# Patient Record
Sex: MALE | Race: WHITE | NOT HISPANIC OR LATINO | Employment: FULL TIME | URBAN - METROPOLITAN AREA
[De-identification: names, ages, dates, MRNs, and addresses within clinical notes are randomized per-mention and may not be internally consistent; named-entity substitution may affect disease eponyms.]

---

## 2018-12-19 ENCOUNTER — OFFICE VISIT (OUTPATIENT)
Dept: FAMILY MEDICINE CLINIC | Facility: CLINIC | Age: 33
End: 2018-12-19

## 2018-12-19 VITALS
TEMPERATURE: 98.4 F | BODY MASS INDEX: 23.86 KG/M2 | HEIGHT: 68 IN | SYSTOLIC BLOOD PRESSURE: 144 MMHG | RESPIRATION RATE: 18 BRPM | HEART RATE: 80 BPM | DIASTOLIC BLOOD PRESSURE: 82 MMHG | WEIGHT: 157.4 LBS

## 2018-12-19 DIAGNOSIS — B35.1 ONYCHOMYCOSIS OF TOENAIL: ICD-10-CM

## 2018-12-19 DIAGNOSIS — B35.3 TINEA PEDIS OF BOTH FEET: ICD-10-CM

## 2018-12-19 DIAGNOSIS — L84 CALLUS OF FOOT: Primary | ICD-10-CM

## 2018-12-19 PROBLEM — F17.200 CURRENT SMOKER: Status: ACTIVE | Noted: 2018-12-19

## 2018-12-19 PROBLEM — F41.8 DEPRESSION WITH ANXIETY: Status: ACTIVE | Noted: 2018-12-19

## 2018-12-19 PROBLEM — F10.10 ALCOHOL ABUSE: Status: ACTIVE | Noted: 2018-12-19

## 2018-12-19 PROCEDURE — 99213 OFFICE O/P EST LOW 20 MIN: CPT | Performed by: FAMILY MEDICINE

## 2018-12-19 RX ORDER — CLOTRIMAZOLE AND BETAMETHASONE DIPROPIONATE 10; .64 MG/G; MG/G
CREAM TOPICAL 2 TIMES DAILY
Qty: 15 G | Refills: 2 | Status: SHIPPED | OUTPATIENT
Start: 2018-12-19 | End: 2019-05-07

## 2018-12-19 RX ORDER — CITALOPRAM 40 MG/1
1 TABLET ORAL DAILY
COMMUNITY
Start: 2011-10-19 | End: 2018-12-19

## 2018-12-19 RX ORDER — LORAZEPAM 0.5 MG/1
TABLET ORAL
COMMUNITY
Start: 2011-05-12 | End: 2018-12-19

## 2018-12-19 RX ORDER — FLUTICASONE PROPIONATE 50 MCG
2 SPRAY, SUSPENSION (ML) NASAL DAILY
COMMUNITY
Start: 2015-01-02 | End: 2018-12-19

## 2018-12-19 NOTE — PROGRESS NOTES
FAMILY PRACTICE OFFICE VISIT  [address]  [address], [address]      NAME: Yocasta Silveira  AGE: 26 y o  SEX: male  : 1985   MRN: [id number]    DATE: 2018  TIME: 1:44 PM    Assessment and Plan     Problem List Items Addressed This Visit     None      Visit Diagnoses     Callus of foot    -  Primary    Relevant Medications    clotrimazole-betamethasone (LOTRISONE) 1-0 05 % cream    Tinea pedis of both feet        Relevant Medications    clotrimazole-betamethasone (LOTRISONE) 1-0 05 % cream    Onychomycosis of toenail              Patient Instructions   He will use Lotrisone cream twice daily for 2 weeks, if not improving after 4 weeks I would like him to see Podiatry, consider nail clipping, has degree onychomycosis great nail  If would require oral antifungal he needs liver function testing, past history elevated LFT  He will call if this persists or worsens  Chief Complaint     Chief Complaint   Patient presents with   [facility] [place] [facility]    Foot Problem       History of Present Illness   Yocasta Silveira is a 26y o -year-old male who has noted callus formation toes past 1 5 yrs -   Concerned may have picked up 'hookworm' while incarcerated  Snips areas w clipper    Used Tinactin w/out benefit      Review of Systems   Review of Systems   Constitutional: Negative for fatigue and fever  Respiratory: Negative for cough, chest tightness and shortness of breath  1/2 PPD smoker   Cardiovascular: Negative for chest pain, palpitations and leg swelling  Gastrointestinal: Negative for abdominal distention, abdominal pain, blood in stool, nausea and vomiting  Genitourinary: Negative for dysuria and hematuria  Musculoskeletal: Positive for arthralgias (right shoulder aches)  Neurological: Negative for dizziness, syncope and light-headedness     Psychiatric/Behavioral:        Hx depression w anxiety -   No current tx -  Was incarcerated until 2 yrs ago -    Past issues w etoh-  Sober x 2 yrs    No drug use       Active Problem List     Patient Active Problem List   Diagnosis    Current smoker    Depression with anxiety    Elevated liver enzymes    Alcohol abuse       Past Medical History:  Past Medical History:   Diagnosis Date    Anxiety     Depression     Headache        Past Surgical History:  Past Surgical History:   Procedure Laterality Date    WISDOM TOOTH EXTRACTION         Family History:  Family History   Problem Relation Age of Onset    Diabetes Mother     Depression Mother     Mental illness Mother     Mental illness Father     Depression Father     Hypertension Maternal Grandmother     Hypertension Maternal Grandfather     Stroke Maternal Grandfather        Social History:  Social History     Social History    Marital status: Single     Spouse name: N/A    Number of children: N/A    Years of education: N/A     Occupational History    Not on file  Social History Main Topics    Smoking status: Current Every Day Smoker     Packs/day: 0 50     Types: Cigarettes    Smokeless tobacco: Never Used    Alcohol use No    Drug use: Yes     Types: Marijuana    Sexual activity: Not on file     Other Topics Concern    Not on file     Social History Narrative    No narrative on file       Objective     Vitals:    12/19/18 1323   BP: 144/82   Pulse: 80   Resp: 18   Temp: 98 4 °F (36 9 °C)   Weight: 71 4 kg (157 lb 6 4 oz)   Height: 5' 7 5" (1 715 m)     Body mass index is 24 29 kg/m²  BP Readings from Last 3 Encounters:   12/19/18 144/82   11/09/15 130/88   08/31/15 150/98       Wt Readings from Last 3 Encounters:   12/19/18 71 4 kg (157 lb 6 4 oz)   11/09/15 80 3 kg (177 lb)   08/31/15 77 6 kg (171 lb 2 1 oz)       Physical Exam   Constitutional: He appears well-developed and well-nourished  No distress     Skin:   Multiple areas callus w thickening toes, area nail yellowing right grt nail bindu    Dry cracked       ALLERGIES:  Allergies   Allergen Reactions    Bupropion Irritability    Codeine        Current Medications     Current Outpatient Prescriptions   Medication Sig Dispense Refill    clotrimazole-betamethasone (LOTRISONE) 1-0 05 % cream Apply topically 2 (two) times a day for 14 days 15 g 2     No current facility-administered medications for this visit  Most recent labs available from [address] [place] [address]   ( others may be available in Care Everywhere / Media sections)  Lab Results   Component Value Date    WBC 7 18 07/08/2015    HGB 16 6 07/08/2015    HCT 48 6 07/08/2015     07/08/2015    LDLDIRECT 198 07/08/2015     (H) 07/08/2015    AST 93 (H) 07/08/2015     (L) 07/08/2015    K 4 0 07/08/2015     07/08/2015    CREATININE 1 07 07/08/2015    BUN 16 07/08/2015    CO2 26 07/08/2015     No results found for: [facility]  Lab Results   Component Value Date    VTW0QHFMUJVF 2 505 07/08/2015         No orders of the defined types were placed in this encounter          Misty Kanner, DO

## 2018-12-19 NOTE — LETTER
December 19, 2018     Patient: Naveen Muse   YOB: 1985   Date of Visit: 12/19/2018       To Whom it May Concern:    Juanito Madera is under my professional care  He was seen in my office on 12/19/2018  If you have any questions or concerns, please don't hesitate to call           Sincerely,          Aruna Munoz, DO

## 2018-12-19 NOTE — PATIENT INSTRUCTIONS
He will use Lotrisone cream twice daily for 2 weeks, if not improving after 4 weeks I would like him to see Podiatry, consider nail clipping, has degree onychomycosis great nail  If would require oral antifungal he needs liver function testing, past history elevated LFT  He will call if this persists or worsens

## 2018-12-20 ENCOUNTER — TELEPHONE (OUTPATIENT)
Dept: FAMILY MEDICINE CLINIC | Facility: CLINIC | Age: 33
End: 2018-12-20

## 2018-12-20 NOTE — TELEPHONE ENCOUNTER
Pt feels that he has a problem with his feet   Thinks that he sees little black things that are moving where he trimmed the calloses on his toes  occassionally notes itching, pinching and little stabs of sharp pain  Wants to see a podiatrist   Harmony Sanchez him number for [facility]

## 2019-05-07 ENCOUNTER — TELEPHONE (OUTPATIENT)
Dept: OTHER | Facility: OTHER | Age: 34
End: 2019-05-07

## 2019-05-07 ENCOUNTER — OFFICE VISIT (OUTPATIENT)
Dept: FAMILY MEDICINE CLINIC | Facility: CLINIC | Age: 34
End: 2019-05-07
Payer: COMMERCIAL

## 2019-05-07 VITALS
TEMPERATURE: 97.8 F | OXYGEN SATURATION: 97 % | HEART RATE: 88 BPM | SYSTOLIC BLOOD PRESSURE: 138 MMHG | BODY MASS INDEX: 24.05 KG/M2 | HEIGHT: 68 IN | WEIGHT: 158.7 LBS | RESPIRATION RATE: 18 BRPM | DIASTOLIC BLOOD PRESSURE: 86 MMHG

## 2019-05-07 DIAGNOSIS — F41.8 DEPRESSION WITH ANXIETY: Primary | ICD-10-CM

## 2019-05-07 DIAGNOSIS — F17.200 CURRENT SMOKER: ICD-10-CM

## 2019-05-07 DIAGNOSIS — R74.8 ELEVATED LIVER ENZYMES: ICD-10-CM

## 2019-05-07 PROBLEM — F10.11 HISTORY OF ALCOHOL ABUSE: Status: ACTIVE | Noted: 2019-05-07

## 2019-05-07 PROCEDURE — 99214 OFFICE O/P EST MOD 30 MIN: CPT | Performed by: INTERNAL MEDICINE

## 2019-05-07 PROCEDURE — 3008F BODY MASS INDEX DOCD: CPT | Performed by: INTERNAL MEDICINE

## 2019-05-07 RX ORDER — CITALOPRAM 10 MG/1
10 TABLET ORAL DAILY
Qty: 30 TABLET | Refills: 5 | Status: SHIPPED | OUTPATIENT
Start: 2019-05-07 | End: 2022-01-11 | Stop reason: ALTCHOICE

## 2019-09-19 ENCOUNTER — PATIENT OUTREACH (OUTPATIENT)
Dept: FAMILY MEDICINE CLINIC | Facility: CLINIC | Age: 34
End: 2019-09-19

## 2019-09-19 ENCOUNTER — OFFICE VISIT (OUTPATIENT)
Dept: FAMILY MEDICINE CLINIC | Facility: CLINIC | Age: 34
End: 2019-09-19
Payer: COMMERCIAL

## 2019-09-19 VITALS
WEIGHT: 171.6 LBS | BODY MASS INDEX: 26.01 KG/M2 | HEIGHT: 68 IN | OXYGEN SATURATION: 90 % | SYSTOLIC BLOOD PRESSURE: 144 MMHG | RESPIRATION RATE: 20 BRPM | HEART RATE: 84 BPM | DIASTOLIC BLOOD PRESSURE: 90 MMHG | TEMPERATURE: 97.4 F

## 2019-09-19 DIAGNOSIS — F11.10 HEROIN ABUSE (HCC): Primary | ICD-10-CM

## 2019-09-19 PROCEDURE — 3008F BODY MASS INDEX DOCD: CPT | Performed by: INTERNAL MEDICINE

## 2019-09-19 PROCEDURE — 99213 OFFICE O/P EST LOW 20 MIN: CPT | Performed by: INTERNAL MEDICINE

## 2019-09-19 NOTE — LETTER
September 19, 2019     Patient: Benito Ramirez   YOB: 1985   Date of Visit: 9/19/2019       To Whom it May Concern:    Kaleigh Martin is under my professional care  He was seen in my office on 9/19/2019  He may return to work on 9/19/19  If you have any questions or concerns, please don't hesitate to call           Sincerely,          Ewelina Fallon DO        CC: No Recipients

## 2019-09-19 NOTE — PROGRESS NOTES
Met with patient following office visit  He is interested in rehab services for substance abuse  Provided him with the contact information for [address] [place], [place] [address] and [facility] and explained he would need to have an assessment completed before getting treatment  He had attended [facility] in the past   Provided him with the contact information for [facility], [facility], Keshawn Levin  Instructed him to call office if he needs further resources and to let us know where he attends rehab

## 2019-09-19 NOTE — PROGRESS NOTES
Assessment/Plan:     Diagnoses and all orders for this visit:    Heroin abuse ([address] )     Fortunately, our  was available for consult in the office today and we provided him with a list of resources to start with  He is eager to quit and will let us know the follow up  Subjective:      Patient ID: April Rodriguez is a 26 y o  male  Jessica Ballardi is here today seeking help for his heroin addiction  Reports he has been using since the age of 13 and quit for a period of 2 years when he had to for incarceration  Reports he did this cold turkey and was rather difficulty  He was with a group [facility] which is a support group as well  He reports he is currently using for the last year daily (nasal use)  Denies alcohol use  Denies depression or thoughts of suicide  Drug Problem   This is a recurrent problem  The current episode started more than 1 year ago  The problem is unchanged  Suspected agents include heroin  Pertinent negatives include no bladder incontinence, bowel incontinence, nausea or vomiting  Past treatments include nothing  His past medical history is significant for addiction treatment  The following portions of the patient's history were reviewed and updated as appropriate: allergies, current medications, past family history, past medical history, past social history, past surgical history and problem list     Review of Systems   Gastrointestinal: Negative for bowel incontinence, nausea and vomiting  Genitourinary: Negative for bladder incontinence  Objective:      /90   Pulse 84   Temp (!) 97 4 °F (36 3 °C)   Resp 20   Ht 5' 7 5" (1 715 m)   Wt 77 8 kg (171 lb 9 6 oz)   SpO2 90%   BMI 26 48 kg/m²          Physical Exam   Constitutional: He is oriented to person, place, and time  He appears well-developed and well-nourished  No distress  HENT:   Head: Normocephalic and atraumatic     Eyes: Conjunctivae and EOM are normal  Right eye exhibits no discharge  Left eye exhibits no discharge  No scleral icterus  Neck: Normal range of motion  Cardiovascular: Normal rate, regular rhythm and normal heart sounds  No murmur heard  Pulmonary/Chest: Effort normal and breath sounds normal  No respiratory distress  He has no wheezes  Musculoskeletal: Normal range of motion  Neurological: He is alert and oriented to person, place, and time  Skin: Skin is warm and dry  He is not diaphoretic  No erythema  Psychiatric: He has a normal mood and affect  His speech is normal and behavior is normal  Judgment and thought content normal    Vitals reviewed

## 2019-10-01 ENCOUNTER — APPOINTMENT (EMERGENCY)
Dept: RADIOLOGY | Facility: HOSPITAL | Age: 34
End: 2019-10-01
Payer: COMMERCIAL

## 2019-10-01 ENCOUNTER — HOSPITAL ENCOUNTER (EMERGENCY)
Facility: HOSPITAL | Age: 34
Discharge: HOME | End: 2019-10-01
Attending: EMERGENCY MEDICINE | Admitting: EMERGENCY MEDICINE
Payer: COMMERCIAL

## 2019-10-01 VITALS
SYSTOLIC BLOOD PRESSURE: 138 MMHG | TEMPERATURE: 97.9 F | WEIGHT: 166.67 LBS | OXYGEN SATURATION: 100 % | HEART RATE: 74 BPM | RESPIRATION RATE: 20 BRPM | DIASTOLIC BLOOD PRESSURE: 83 MMHG | BODY MASS INDEX: 25.72 KG/M2

## 2019-10-01 DIAGNOSIS — R42 LIGHTHEADEDNESS: Primary | ICD-10-CM

## 2019-10-01 DIAGNOSIS — E86.0 DEHYDRATION: ICD-10-CM

## 2019-10-01 DIAGNOSIS — D72.829 LEUKOCYTOSIS: ICD-10-CM

## 2019-10-01 DIAGNOSIS — R53.83 FATIGUE: ICD-10-CM

## 2019-10-01 LAB
ANION GAP SERPL CALCULATED.3IONS-SCNC: 7 MMOL/L (ref 4–13)
ATRIAL RATE: 73 BPM
BASOPHILS # BLD AUTO: 0.05 THOUSANDS/ΜL (ref 0–0.1)
BASOPHILS NFR BLD AUTO: 0 % (ref 0–1)
BILIRUB UR QL STRIP: NEGATIVE
BUN SERPL-MCNC: 15 MG/DL (ref 5–25)
CALCIUM SERPL-MCNC: 9.5 MG/DL (ref 8.3–10.1)
CHLORIDE SERPL-SCNC: 105 MMOL/L (ref 100–108)
CLARITY UR: CLEAR
CO2 SERPL-SCNC: 29 MMOL/L (ref 21–32)
COLOR UR: YELLOW
COLOR, POC: YELLOW
CREAT SERPL-MCNC: 0.88 MG/DL (ref 0.6–1.3)
EOSINOPHIL # BLD AUTO: 0.16 THOUSAND/ΜL (ref 0–0.61)
EOSINOPHIL NFR BLD AUTO: 1 % (ref 0–6)
ERYTHROCYTE [DISTWIDTH] IN BLOOD BY AUTOMATED COUNT: 13.7 % (ref 11.6–15.1)
GFR SERPL CREATININE-BSD FRML MDRD: 111 ML/MIN/1.73SQ M
GLUCOSE SERPL-MCNC: 94 MG/DL (ref 65–140)
GLUCOSE UR STRIP-MCNC: NEGATIVE MG/DL
HCT VFR BLD AUTO: 52 % (ref 36.5–49.3)
HGB BLD-MCNC: 16.9 G/DL (ref 12–17)
HGB UR QL STRIP.AUTO: NEGATIVE
IMM GRANULOCYTES # BLD AUTO: 0.18 THOUSAND/UL (ref 0–0.2)
IMM GRANULOCYTES NFR BLD AUTO: 1 % (ref 0–2)
KETONES UR STRIP-MCNC: NEGATIVE MG/DL
LEUKOCYTE ESTERASE UR QL STRIP: NEGATIVE
LYMPHOCYTES # BLD AUTO: 3.1 THOUSANDS/ΜL (ref 0.6–4.47)
LYMPHOCYTES NFR BLD AUTO: 24 % (ref 14–44)
MCH RBC QN AUTO: 29 PG (ref 26.8–34.3)
MCHC RBC AUTO-ENTMCNC: 32.5 G/DL (ref 31.4–37.4)
MCV RBC AUTO: 89 FL (ref 82–98)
MONOCYTES # BLD AUTO: 0.98 THOUSAND/ΜL (ref 0.17–1.22)
MONOCYTES NFR BLD AUTO: 8 % (ref 4–12)
NEUTROPHILS # BLD AUTO: 8.5 THOUSANDS/ΜL (ref 1.85–7.62)
NEUTS SEG NFR BLD AUTO: 66 % (ref 43–75)
NITRITE UR QL STRIP: NEGATIVE
NRBC BLD AUTO-RTO: 0 /100 WBCS
P AXIS: 46 DEGREES
PH UR STRIP.AUTO: 6.5 [PH] (ref 4.5–8)
PLATELET # BLD AUTO: 382 THOUSANDS/UL (ref 149–390)
PMV BLD AUTO: 8.4 FL (ref 8.9–12.7)
POTASSIUM SERPL-SCNC: 3.9 MMOL/L (ref 3.5–5.3)
PR INTERVAL: 136 MS
PROT UR STRIP-MCNC: NEGATIVE MG/DL
QRS AXIS: 81 DEGREES
QRSD INTERVAL: 92 MS
QT INTERVAL: 372 MS
QTC INTERVAL: 409 MS
RBC # BLD AUTO: 5.83 MILLION/UL (ref 3.88–5.62)
SODIUM SERPL-SCNC: 141 MMOL/L (ref 136–145)
SP GR UR STRIP.AUTO: 1.02 (ref 1–1.03)
T WAVE AXIS: 72 DEGREES
TSH SERPL DL<=0.05 MIU/L-ACNC: 1.93 UIU/ML (ref 0.36–3.74)
UROBILINOGEN UR QL STRIP.AUTO: 0.2 E.U./DL
VENTRICULAR RATE: 73 BPM
WBC # BLD AUTO: 12.97 THOUSAND/UL (ref 4.31–10.16)

## 2019-10-01 PROCEDURE — 84443 ASSAY THYROID STIM HORMONE: CPT | Performed by: EMERGENCY MEDICINE

## 2019-10-01 PROCEDURE — 96360 HYDRATION IV INFUSION INIT: CPT

## 2019-10-01 PROCEDURE — 93010 ELECTROCARDIOGRAM REPORT: CPT | Performed by: INTERNAL MEDICINE

## 2019-10-01 PROCEDURE — 99284 EMERGENCY DEPT VISIT MOD MDM: CPT

## 2019-10-01 PROCEDURE — 36415 COLL VENOUS BLD VENIPUNCTURE: CPT | Performed by: EMERGENCY MEDICINE

## 2019-10-01 PROCEDURE — 71046 X-RAY EXAM CHEST 2 VIEWS: CPT

## 2019-10-01 PROCEDURE — 85025 COMPLETE CBC W/AUTO DIFF WBC: CPT | Performed by: EMERGENCY MEDICINE

## 2019-10-01 PROCEDURE — 93005 ELECTROCARDIOGRAM TRACING: CPT

## 2019-10-01 PROCEDURE — 81003 URINALYSIS AUTO W/O SCOPE: CPT

## 2019-10-01 PROCEDURE — 80048 BASIC METABOLIC PNL TOTAL CA: CPT | Performed by: EMERGENCY MEDICINE

## 2019-10-01 PROCEDURE — 99284 EMERGENCY DEPT VISIT MOD MDM: CPT | Performed by: EMERGENCY MEDICINE

## 2019-10-01 RX ADMIN — SODIUM CHLORIDE 1000 ML: 0.9 INJECTION, SOLUTION INTRAVENOUS at 12:02

## 2019-10-01 NOTE — ED PROVIDER NOTES
History  Chief Complaint   Patient presents with    Dizziness     pt c/o being dizzy, sob and feeling with low energy for the past 3 weeks,     57-year-old male presents for evaluation of the generalized fatigue, lightheadedness, feeling intermittently short of breath over the last 2 weeks  Patient states he fell to get a flu-like illness for 2-3 days preceding this which resolved and then it persistent lightheadedness, intermittent dyspnea, generalized fatigue  He states it has been constant, getting progressively worse and is without modifying factors  Patient denies chest pain, headache, neck pain or neck stiffness, vertigo, focal neuro sore weakness, neck pain or neck stiffness, rash, fevers the, lower extremity edema calf pain, urinary complaints  History provided by:  Patient  Dizziness   Associated symptoms: no blood in stool, no chest pain, no diarrhea, no headaches, no nausea, no palpitations, no shortness of breath, no vomiting and no weakness        Prior to Admission Medications   Prescriptions Last Dose Informant Patient Reported? Taking?   citalopram (CeleXA) 10 mg tablet   No No   Sig: Take 1 tablet (10 mg total) by mouth daily   Patient not taking: Reported on 9/19/2019      Facility-Administered Medications: None       Past Medical History:   Diagnosis Date    Headache     Psychiatric disorder        Past Surgical History:   Procedure Laterality Date    WISDOM TOOTH EXTRACTION         Family History   Problem Relation Age of Onset    Diabetes Mother     Depression Mother     Mental illness Mother     Mental illness Father     Depression Father     Hypertension Maternal Grandmother     Hypertension Maternal Grandfather     Stroke Maternal Grandfather      I have reviewed and agree with the history as documented      Social History     Tobacco Use    Smoking status: Current Every Day Smoker     Packs/day: 1 00     Types: Cigarettes    Smokeless tobacco: Never Used   Substance Use Topics    Alcohol use: No    Drug use: Not Currently        Review of Systems   Constitutional: Positive for chills and fatigue  Negative for activity change, appetite change and fever  HENT: Negative for congestion, dental problem, ear pain, rhinorrhea and sore throat  Eyes: Negative for pain and redness  Respiratory: Negative for chest tightness, shortness of breath and wheezing  Cardiovascular: Negative for chest pain and palpitations  Gastrointestinal: Negative for abdominal pain, blood in stool, constipation, diarrhea, nausea and vomiting  Endocrine: Negative for cold intolerance and heat intolerance  Genitourinary: Negative for dysuria, frequency and hematuria  Musculoskeletal: Negative for arthralgias, back pain, myalgias, neck pain and neck stiffness  Skin: Negative for color change, pallor and rash  Neurological: Positive for light-headedness  Negative for dizziness, facial asymmetry, weakness, numbness and headaches  Hematological: Does not bruise/bleed easily  Psychiatric/Behavioral: Negative for agitation, hallucinations and suicidal ideas  Physical Exam  Physical Exam   Constitutional: He is oriented to person, place, and time  He appears well-developed and well-nourished  HENT:   Mouth/Throat: No oropharyngeal exudate  TMs normal bilaterally no pharyngeal erythema no rhinorrhea nontender palpation of sinuses, normal looking turbinates   Eyes: Pupils are equal, round, and reactive to light  Conjunctivae and EOM are normal    Neck: Normal range of motion  Neck supple  No meningeal signs   Cardiovascular: Normal rate, regular rhythm, normal heart sounds and intact distal pulses  Pulmonary/Chest: Effort normal and breath sounds normal  No respiratory distress  He has no wheezes  He has no rales  He exhibits no tenderness  Abdominal: Soft  Bowel sounds are normal  He exhibits no distension and no mass  There is no tenderness  No hernia     No cvat Musculoskeletal: Normal range of motion  He exhibits no edema  Lymphadenopathy:     He has no cervical adenopathy  Neurological: He is alert and oriented to person, place, and time  No cranial nerve deficit  Normal cerebellar exam, normal strength sensation throughout, normal gait,   Skin: No rash noted  No erythema  No edema   Psychiatric: He has a normal mood and affect  His behavior is normal    Nursing note and vitals reviewed        Vital Signs  ED Triage Vitals [10/01/19 1137]   Temperature Pulse Respirations Blood Pressure SpO2   97 9 °F (36 6 °C) 77 16 153/88 100 %      Temp Source Heart Rate Source Patient Position - Orthostatic VS BP Location FiO2 (%)   Oral Monitor Lying Right arm --      Pain Score       No Pain           Vitals:    10/01/19 1137 10/01/19 1145 10/01/19 1245 10/01/19 1343   BP: 153/88 153/88  138/83   Pulse: 77 84 66 74   Patient Position - Orthostatic VS: Lying   Lying         Visual Acuity      ED Medications  Medications   sodium chloride 0 9 % bolus 1,000 mL (0 mL Intravenous Stopped 10/1/19 1325)       Diagnostic Studies  Results Reviewed     Procedure Component Value Units Date/Time    POCT urinalysis dipstick [[id number]]  (Normal) Resulted:  10/01/19 1340    Lab Status:  Final result Specimen:  Urine Updated:  10/01/19 1341     Color, UA yellow    ED Urine Macroscopic [[id number]] Collected:  10/01/19 1338    Lab Status:  Final result Specimen:  Urine Updated:  10/01/19 1340     Color, UA Yellow     Clarity, UA Clear     pH, UA 6 5     Leukocytes, UA Negative     Nitrite, UA Negative     Protein, UA Negative mg/dl      Glucose, UA Negative mg/dl      Ketones, UA Negative mg/dl      Urobilinogen, UA 0 2 E U /dl      Bilirubin, UA Negative     Blood, UA Negative     Specific Gravity, UA 1 020    Narrative:       [facility] RESULT    Basic metabolic panel [[id number]] Collected:  10/01/19 1156    Lab Status:  Final result Specimen:  Blood from Arm, Left Updated:  10/01/19 1236 Sodium 141 mmol/L      Potassium 3 9 mmol/L      Chloride 105 mmol/L      CO2 29 mmol/L      ANION GAP 7 mmol/L      BUN 15 mg/dL      Creatinine 0 88 mg/dL      Glucose 94 mg/dL      Calcium 9 5 mg/dL      eGFR 111 ml/min/1 73sq m     Narrative:       [place] guidelines for Chronic Kidney Disease (CKD):     Stage 1 with normal or high GFR (GFR > 90 mL/min/1 73 square meters)    Stage 2 Mild CKD (GFR = 60-89 mL/min/1 73 square meters)    Stage 3A Moderate CKD (GFR = 45-59 mL/min/1 73 square meters)    Stage 3B Moderate CKD (GFR = 30-44 mL/min/1 73 square meters)    Stage 4 Severe CKD (GFR = 15-29 mL/min/1 73 square meters)    Stage 5 End Stage CKD (GFR <15 mL/min/1 73 square meters)  Note: GFR calculation is accurate only with a steady state creatinine    TSH [[id number]]  (Normal) Collected:  10/01/19 1156    Lab Status:  Final result Specimen:  Blood from Arm, Left Updated:  10/01/19 1236     TSH 3RD GENERATON 1 929 uIU/mL     Narrative:       Patients undergoing fluorescein dye angiography may retain small amounts of fluorescein in the body for 48-72 hours post procedure  Samples containing fluorescein can produce falsely depressed TSH values  If the patient had this procedure,a specimen should be resubmitted post fluorescein clearance        CBC and differential [[id number]]  (Abnormal) Collected:  10/01/19 1156    Lab Status:  Final result Specimen:  Blood from Arm, Left Updated:  10/01/19 1206     WBC 12 97 Thousand/uL      RBC 5 83 Million/uL      Hemoglobin 16 9 g/dL      Hematocrit 52 0 %      MCV 89 fL      MCH 29 0 pg      MCHC 32 5 g/dL      RDW 13 7 %      MPV 8 4 fL      Platelets 311 Thousands/uL      nRBC 0 /100 WBCs      Neutrophils Relative 66 %      Immat GRANS % 1 %      Lymphocytes Relative 24 %      Monocytes Relative 8 %      Eosinophils Relative 1 %      Basophils Relative 0 %      Neutrophils Absolute 8 50 Thousands/µL      Immature Grans Absolute 0 18 Thousand/uL      Lymphocytes Absolute 3 10 Thousands/µL      Monocytes Absolute 0 98 Thousand/µL      Eosinophils Absolute 0 16 Thousand/µL      Basophils Absolute 0 05 Thousands/µL                  XR chest 2 views   Final Result by Sujata Marlow MD (10/01 1234)      No acute cardiopulmonary disease  Workstation performed: [id number]                    Procedures  Procedures       ED Course  ED Course as of Oct 01 1352   Tue Oct 01, 2019   1348 Work up reviewed and benign  Pt asymptomatic  Symptoms likely 2/2 dehydration, will dc t home  Pt and family aware of elevated wbc's and need for outpatient f/u                                  MDM  Number of Diagnoses or Management Options  Diagnosis management comments: Generalized fatigue, weakness, run down sensation after having a flu-like illness for 1-2 days over the past 2 weeks which has been slowly improving without modifying factors and with benign exam-will check TSH for thyroid dysfunction, chest x-ray rule out pneumonia, EKG, labs, IV fluids, symmetrical reassess      Disposition  Final diagnoses:   Lightheadedness   Fatigue   Dehydration   Leukocytosis     Time reflects when diagnosis was documented in both MDM as applicable and the Disposition within this note     Time User Action Codes Description Comment    10/1/2019  1:51 PM Quan Molt Add [R42] Lightheadedness     10/1/2019  1:51 PM Alcides REED Add [R53 83] Fatigue     10/1/2019  1:51 PM Alcides REED Add [E86 0] Dehydration     10/1/2019  1:52 PM Irluis carlos Molt Add [M54 357] Leukocytosis       ED Disposition     ED Disposition Condition Date/Time Comment    Discharge Stable Tue Oct 1, 2019  1:50 PM Surinder Albright discharge to home/self care  Follow-up Information     Follow up With Specialties Details Why Contact Info    Ziyad Tsang DO Family Medicine Schedule an appointment as soon as possible for a visit in 2 days  [address]    [address] [facility] [address]  [phone]            Patient's Medications   Discharge Prescriptions    No medications on file     No discharge procedures on file      ED Provider  Electronically Signed by           Modesto Sullivan MD  10/01/19 0529

## 2022-01-11 ENCOUNTER — OFFICE VISIT (OUTPATIENT)
Dept: FAMILY MEDICINE CLINIC | Facility: CLINIC | Age: 37
End: 2022-01-11
Payer: COMMERCIAL

## 2022-01-11 VITALS
SYSTOLIC BLOOD PRESSURE: 140 MMHG | DIASTOLIC BLOOD PRESSURE: 70 MMHG | OXYGEN SATURATION: 99 % | RESPIRATION RATE: 12 BRPM | WEIGHT: 164 LBS | BODY MASS INDEX: 24.86 KG/M2 | HEIGHT: 68 IN | HEART RATE: 74 BPM

## 2022-01-11 DIAGNOSIS — Z13.220 SCREENING FOR HYPERLIPIDEMIA: ICD-10-CM

## 2022-01-11 DIAGNOSIS — Z72.51 UNPROTECTED SEX: ICD-10-CM

## 2022-01-11 DIAGNOSIS — Z13.1 SCREENING FOR DIABETES MELLITUS: ICD-10-CM

## 2022-01-11 DIAGNOSIS — R53.83 OTHER FATIGUE: ICD-10-CM

## 2022-01-11 DIAGNOSIS — R03.0 ELEVATED BLOOD PRESSURE READING: ICD-10-CM

## 2022-01-11 DIAGNOSIS — Z00.00 ANNUAL PHYSICAL EXAM: Primary | ICD-10-CM

## 2022-01-11 DIAGNOSIS — Z13.89 SCREENING FOR HEMATURIA OR PROTEINURIA: ICD-10-CM

## 2022-01-11 DIAGNOSIS — F11.10 HEROIN ABUSE (HCC): ICD-10-CM

## 2022-01-11 DIAGNOSIS — Z13.29 SCREENING FOR HYPOTHYROIDISM: ICD-10-CM

## 2022-01-11 DIAGNOSIS — Z11.4 SCREENING FOR HIV (HUMAN IMMUNODEFICIENCY VIRUS): ICD-10-CM

## 2022-01-11 DIAGNOSIS — Z11.59 NEED FOR HEPATITIS C SCREENING TEST: ICD-10-CM

## 2022-01-11 DIAGNOSIS — Z11.4 ENCOUNTER FOR SCREENING FOR HIV: ICD-10-CM

## 2022-01-11 DIAGNOSIS — Z13.0 SCREENING FOR DEFICIENCY ANEMIA: ICD-10-CM

## 2022-01-11 PROCEDURE — 3008F BODY MASS INDEX DOCD: CPT | Performed by: INTERNAL MEDICINE

## 2022-01-11 PROCEDURE — 3725F SCREEN DEPRESSION PERFORMED: CPT | Performed by: INTERNAL MEDICINE

## 2022-01-11 PROCEDURE — 99395 PREV VISIT EST AGE 18-39: CPT | Performed by: INTERNAL MEDICINE

## 2022-01-11 NOTE — PATIENT INSTRUCTIONS
Please remove all salt from your diet  Go to [employer] regular size blood pressure cuff, check it 2 times a day for 10 days and let me know the numbers

## 2022-01-12 NOTE — PROGRESS NOTES
Assessment/Plan:    No problem-specific Assessment & Plan notes found for this encounter  Diagnoses and all orders for this visit:    Annual physical exam    Heroin abuse ([address] )    Need for hepatitis C screening test  -     Hepatitis C Antibody ([facility]); Future    Screening for HIV (human immunodeficiency virus)  -     HIV 1/2 Antigen/Antibody (4th Generation) w Reflex [facility]; Future    Elevated blood pressure reading    Unprotected sex  -     Chlamydia/GC amplified DNA by PCR; Future    Encounter for screening for HIV    Screening for diabetes mellitus  -     HEMOGLOBIN A1C W/ EAG ESTIMATION; Future    Screening for hematuria or proteinuria  -     UA (URINE) with reflex to Scope    Other fatigue  -     Comprehensive metabolic panel; Future  -     Vitamin D 25 hydroxy; Future    Screening for hyperlipidemia  -     Lipid panel; Future    Screening for deficiency anemia  -     CBC and differential; Future    Screening for hypothyroidism  -     TSH, 3rd generation with Free T4 reflex; Future    Other orders  -     Cancel: TDAP VACCINE GREATER THAN OR EQUAL TO 8YO IM          Subjective:      Patient ID: Justin Rodriguez is a 43 y o  male  Patient came for annual checkup  No active complaints  His blood pressure was slightly elevated today, he will check it at home and he will let me know  No family history of colon or prostate cancer  He also reported episodes of unprotected sex intercourse and he would like to be tested for STD          The following portions of the patient's history were reviewed and updated as appropriate: allergies, current medications, past family history, past medical history, past social history, past surgical history, and problem list     Review of Systems      Objective:      /70 (BP Location: Left arm, Patient Position: Sitting, Cuff Size: Standard)   Pulse 74   Resp 12   Ht 5' 8" (1 727 m)   Wt 74 4 kg (164 lb)   SpO2 99%   BMI 24 94 kg/m²     Allergies Allergen Reactions    Bupropion Irritability    Codeine         No current outpatient medications on file  Patient Instructions   Please remove all salt from your diet  Go to [employer] regular size blood pressure cuff, check it 2 times a day for 10 days and let me know the numbers                            Physical Exam

## 2022-01-14 ENCOUNTER — APPOINTMENT (OUTPATIENT)
Dept: LAB | Facility: MEDICAL CENTER | Age: 37
End: 2022-01-14
Payer: COMMERCIAL

## 2022-01-14 DIAGNOSIS — Z11.4 SCREENING FOR HIV (HUMAN IMMUNODEFICIENCY VIRUS): ICD-10-CM

## 2022-01-14 DIAGNOSIS — Z13.220 SCREENING FOR HYPERLIPIDEMIA: ICD-10-CM

## 2022-01-14 DIAGNOSIS — R53.83 OTHER FATIGUE: ICD-10-CM

## 2022-01-14 DIAGNOSIS — Z72.51 UNPROTECTED SEX: ICD-10-CM

## 2022-01-14 DIAGNOSIS — Z13.1 SCREENING FOR DIABETES MELLITUS: ICD-10-CM

## 2022-01-14 DIAGNOSIS — Z13.29 SCREENING FOR HYPOTHYROIDISM: ICD-10-CM

## 2022-01-14 DIAGNOSIS — Z11.59 NEED FOR HEPATITIS C SCREENING TEST: ICD-10-CM

## 2022-01-14 DIAGNOSIS — Z13.0 SCREENING FOR DEFICIENCY ANEMIA: ICD-10-CM

## 2022-01-14 LAB
25(OH)D3 SERPL-MCNC: 56.9 NG/ML (ref 30–100)
ALBUMIN SERPL BCP-MCNC: 3.9 G/DL (ref 3.5–5)
ALP SERPL-CCNC: 84 U/L (ref 46–116)
ALT SERPL W P-5'-P-CCNC: 33 U/L (ref 12–78)
ANION GAP SERPL CALCULATED.3IONS-SCNC: 4 MMOL/L (ref 4–13)
AST SERPL W P-5'-P-CCNC: 19 U/L (ref 5–45)
BACTERIA UR QL AUTO: NORMAL /HPF
BASOPHILS # BLD AUTO: 0.01 THOUSANDS/ΜL (ref 0–0.1)
BASOPHILS NFR BLD AUTO: 0 % (ref 0–1)
BILIRUB SERPL-MCNC: 1.29 MG/DL (ref 0.2–1)
BILIRUB UR QL STRIP: NEGATIVE
BUN SERPL-MCNC: 18 MG/DL (ref 5–25)
CALCIUM SERPL-MCNC: 9.1 MG/DL (ref 8.3–10.1)
CHLORIDE SERPL-SCNC: 108 MMOL/L (ref 100–108)
CHOLEST SERPL-MCNC: 164 MG/DL
CLARITY UR: CLEAR
CO2 SERPL-SCNC: 30 MMOL/L (ref 21–32)
COLOR UR: YELLOW
CREAT SERPL-MCNC: 1.08 MG/DL (ref 0.6–1.3)
EOSINOPHIL # BLD AUTO: 0.1 THOUSAND/ΜL (ref 0–0.61)
EOSINOPHIL NFR BLD AUTO: 2 % (ref 0–6)
ERYTHROCYTE [DISTWIDTH] IN BLOOD BY AUTOMATED COUNT: 13.4 % (ref 11.6–15.1)
EST. AVERAGE GLUCOSE BLD GHB EST-MCNC: 108 MG/DL
GFR SERPL CREATININE-BSD FRML MDRD: 86 ML/MIN/1.73SQ M
GLUCOSE P FAST SERPL-MCNC: 104 MG/DL (ref 65–99)
GLUCOSE UR STRIP-MCNC: NEGATIVE MG/DL
HBA1C MFR BLD: 5.4 %
HCT VFR BLD AUTO: 45 % (ref 36.5–49.3)
HCV AB SER QL: NORMAL
HDLC SERPL-MCNC: 55 MG/DL
HGB BLD-MCNC: 14.5 G/DL (ref 12–17)
HGB UR QL STRIP.AUTO: NEGATIVE
HYALINE CASTS #/AREA URNS LPF: NORMAL /LPF
IMM GRANULOCYTES # BLD AUTO: 0.02 THOUSAND/UL (ref 0–0.2)
IMM GRANULOCYTES NFR BLD AUTO: 0 % (ref 0–2)
KETONES UR STRIP-MCNC: ABNORMAL MG/DL
LDLC SERPL CALC-MCNC: 91 MG/DL (ref 0–100)
LEUKOCYTE ESTERASE UR QL STRIP: NEGATIVE
LYMPHOCYTES # BLD AUTO: 1.69 THOUSANDS/ΜL (ref 0.6–4.47)
LYMPHOCYTES NFR BLD AUTO: 26 % (ref 14–44)
MCH RBC QN AUTO: 28 PG (ref 26.8–34.3)
MCHC RBC AUTO-ENTMCNC: 32.2 G/DL (ref 31.4–37.4)
MCV RBC AUTO: 87 FL (ref 82–98)
MONOCYTES # BLD AUTO: 0.47 THOUSAND/ΜL (ref 0.17–1.22)
MONOCYTES NFR BLD AUTO: 7 % (ref 4–12)
NEUTROPHILS # BLD AUTO: 4.31 THOUSANDS/ΜL (ref 1.85–7.62)
NEUTS SEG NFR BLD AUTO: 65 % (ref 43–75)
NITRITE UR QL STRIP: NEGATIVE
NON-SQ EPI CELLS URNS QL MICRO: NORMAL /HPF
NONHDLC SERPL-MCNC: 109 MG/DL
NRBC BLD AUTO-RTO: 0 /100 WBCS
PH UR STRIP.AUTO: 8 [PH]
PLATELET # BLD AUTO: 398 THOUSANDS/UL (ref 149–390)
PMV BLD AUTO: 9.2 FL (ref 8.9–12.7)
POTASSIUM SERPL-SCNC: 4.2 MMOL/L (ref 3.5–5.3)
PROT SERPL-MCNC: 7.5 G/DL (ref 6.4–8.2)
PROT UR STRIP-MCNC: ABNORMAL MG/DL
RBC # BLD AUTO: 5.18 MILLION/UL (ref 3.88–5.62)
RBC #/AREA URNS AUTO: NORMAL /HPF
SODIUM SERPL-SCNC: 142 MMOL/L (ref 136–145)
SP GR UR STRIP.AUTO: 1.02 (ref 1–1.03)
TRIGL SERPL-MCNC: 88 MG/DL
TSH SERPL DL<=0.05 MIU/L-ACNC: 1.3 UIU/ML (ref 0.36–3.74)
UROBILINOGEN UR QL STRIP.AUTO: 0.2 E.U./DL
WBC # BLD AUTO: 6.6 THOUSAND/UL (ref 4.31–10.16)
WBC #/AREA URNS AUTO: NORMAL /HPF

## 2022-01-14 PROCEDURE — 36415 COLL VENOUS BLD VENIPUNCTURE: CPT

## 2022-01-14 PROCEDURE — 87491 CHLMYD TRACH DNA AMP PROBE: CPT

## 2022-01-14 PROCEDURE — 86803 HEPATITIS C AB TEST: CPT

## 2022-01-14 PROCEDURE — 85025 COMPLETE CBC W/AUTO DIFF WBC: CPT

## 2022-01-14 PROCEDURE — 82306 VITAMIN D 25 HYDROXY: CPT

## 2022-01-14 PROCEDURE — 81001 URINALYSIS AUTO W/SCOPE: CPT | Performed by: INTERNAL MEDICINE

## 2022-01-14 PROCEDURE — 87591 N.GONORRHOEAE DNA AMP PROB: CPT

## 2022-01-14 PROCEDURE — 80053 COMPREHEN METABOLIC PANEL: CPT

## 2022-01-14 PROCEDURE — 87389 HIV-1 AG W/HIV-1&-2 AB AG IA: CPT

## 2022-01-14 PROCEDURE — 84443 ASSAY THYROID STIM HORMONE: CPT

## 2022-01-14 PROCEDURE — 80061 LIPID PANEL: CPT

## 2022-01-14 PROCEDURE — 83036 HEMOGLOBIN GLYCOSYLATED A1C: CPT

## 2022-01-17 LAB
C TRACH DNA SPEC QL NAA+PROBE: NEGATIVE
HIV 1+2 AB+HIV1 P24 AG SERPL QL IA: NORMAL
N GONORRHOEA DNA SPEC QL NAA+PROBE: NEGATIVE